# Patient Record
Sex: MALE | Employment: OTHER | URBAN - METROPOLITAN AREA
[De-identification: names, ages, dates, MRNs, and addresses within clinical notes are randomized per-mention and may not be internally consistent; named-entity substitution may affect disease eponyms.]

---

## 2019-10-05 ENCOUNTER — HOSPITAL ENCOUNTER (EMERGENCY)
Facility: CLINIC | Age: 54
Discharge: HOME | End: 2019-10-05
Attending: EMERGENCY MEDICINE | Admitting: EMERGENCY MEDICINE
Payer: COMMERCIAL

## 2019-10-05 ENCOUNTER — NURSE TRIAGE (OUTPATIENT)
Dept: NURSING | Facility: CLINIC | Age: 54
End: 2019-10-05

## 2019-10-05 ENCOUNTER — APPOINTMENT (OUTPATIENT)
Dept: GENERAL RADIOLOGY | Facility: CLINIC | Age: 54
End: 2019-10-05
Attending: EMERGENCY MEDICINE
Payer: COMMERCIAL

## 2019-10-05 VITALS
TEMPERATURE: 98 F | OXYGEN SATURATION: 96 % | RESPIRATION RATE: 18 BRPM | WEIGHT: 205 LBS | HEIGHT: 71 IN | BODY MASS INDEX: 28.7 KG/M2 | SYSTOLIC BLOOD PRESSURE: 122 MMHG | DIASTOLIC BLOOD PRESSURE: 68 MMHG

## 2019-10-05 DIAGNOSIS — S80.02XA CONTUSION OF LEFT KNEE, INITIAL ENCOUNTER: ICD-10-CM

## 2019-10-05 PROCEDURE — 99283 EMERGENCY DEPT VISIT LOW MDM: CPT

## 2019-10-05 PROCEDURE — 73562 X-RAY EXAM OF KNEE 3: CPT | Mod: LT

## 2019-10-05 RX ORDER — IBUPROFEN 600 MG/1
600 TABLET, FILM COATED ORAL EVERY 6 HOURS PRN
Qty: 20 TABLET | Refills: 0 | Status: SHIPPED | OUTPATIENT
Start: 2019-10-05

## 2019-10-05 RX ORDER — ACETAMINOPHEN 500 MG
1000 TABLET ORAL EVERY 6 HOURS PRN
Qty: 30 TABLET | Refills: 0 | Status: SHIPPED | OUTPATIENT
Start: 2019-10-05

## 2019-10-05 ASSESSMENT — ENCOUNTER SYMPTOMS
WEAKNESS: 0
ARTHRALGIAS: 1
NUMBNESS: 0

## 2019-10-05 ASSESSMENT — MIFFLIN-ST. JEOR: SCORE: 1802

## 2019-10-05 NOTE — ED PROVIDER NOTES
"  History     Chief Complaint:  Knee Pain      HPI   Jose Antonio Bailey is a 54 year old male who presents with left knee pain. The patient says that he was at a hotel for a work even and he ended up hitting his knee on the corner of some bricks on one of the rooms. He says that the pain is so bad that he cannot walk sometimes. He says that he tried icing and using heat pads to help the pain, but it did not work. He endorses the use of ibuprofen to minor relief. The patient says that he also had some alcohol earlier tonight.       Allergies:  Morphine  Penicillins      Medications:    Norco  Meloxicam  Robaxin  Ibuprofen      Past Medical History:    Traumatic diastasis of symphysis pubis  Closed fracture of head of right fibula  Sacral fracture  Hyperlipidemia  osteoarthritis  Colon polyps  Insomnia  Congenital spondylolisthesis  Degeneration of intervertebral disc  Alcohol abuse  Pulmonary nodule       Past Surgical History:    Carpal tunnel release x3  Finger trigger release    Family History:    Colon cancer  Prostate cancer    Social History:  The patient was accompanied to the ED by friend.  Smoking Status: Never Smoker  Smokeless Tobacco: Never Used  Alcohol Use: Positive  Drug Use: Negative     Review of Systems   Musculoskeletal: Positive for arthralgias.   Neurological: Negative for weakness and numbness.   All other systems reviewed and are negative.        Physical Exam     Patient Vitals for the past 24 hrs:   BP Temp Temp src Heart Rate Resp SpO2 Height Weight   10/05/19 0133 122/68 98  F (36.7  C) Oral 99 18 96 % 1.803 m (5' 11\") 93 kg (205 lb)        Physical Exam  General: Appears well-developed and well-nourished. Appears somewhat intoxicated, but alert and conversant  Head: No signs of trauma.   CV: Normal rate and regular rhythm.    Resp: Effort normal and breath sounds normal. No respiratory distress.   MSK:  +tenderness directly over left patella.  No medial/lateral tenderness.  No bony " deformities.  +neurovascularly intact distally.    Neuro: The patient is alert and oriented.  Speech normal.  GCS 15  Skin: Skin is warm and dry. Rash to bilateral upper thighs  Psych: normal mood and affect. behavior is normal.     Emergency Department Course     Imaging:  Radiology findings were communicated with the patient who voiced understanding of the findings.    XR Knee Left 3 Views  Mild soft tissue swelling anteriorly. No acute fracture or  dislocation. Slight degenerative changes.  Reading per radiology    Emergency Department Course:    0146 Nursing notes and vitals reviewed.    0151 I performed an exam of the patient as documented above.     0207 The patient was sent for a XR while in the emergency department, results above.      0245 Patient rechecked and updated.      0320 The patient is discharged to home.     Impression & Plan      Medical Decision Making:  Jose Antonio Bailey is a 54 year old male who presents to the emergency department today for evaluation of left knee pain.  Reports that yesterday he hit his left knee on the corner of a stone fireplace and since that time he said significant pain over the central portion of the left patella.  On my evaluation, there is no clear bony deformity.  There is no erythema or warmth.  X-rays obtained did not show signs of fracture.  Patient did request stronger medication such as opiate medications.  I did discuss with him that I do not feel it would be appropriate in this setting and recommended Tylenol, ibuprofen, ice, and rest.  He has received opiate pain medications recently according to his .  His knee was wrapped in Ace bandage and he was given crutches to help rest the leg.  He has been referred to a pain specialist in the past for chronic pain and was recommended to continue to work with them.  He apparently has an appointment to see them on 10/8.        Diagnosis:    ICD-10-CM    1. Contusion of left knee, initial encounter S80.02XA       Disposition:   Findings and plan explained to the Patient. Patient discharged home with instructions regarding supportive care, medications, and reasons to return. The importance of close follow-up was reviewed.      Discharge Medications:     Review of your medicines      START taking      Dose / Directions   acetaminophen 500 MG tablet  Commonly known as:  TYLENOL      Dose:  1,000 mg  Take 2 tablets (1,000 mg) by mouth every 6 hours as needed for mild pain  Quantity:  30 tablet  Refills:  0     ibuprofen 600 MG tablet  Commonly known as:  ADVIL/MOTRIN      Dose:  600 mg  Take 1 tablet (600 mg) by mouth every 6 hours as needed for moderate pain  Quantity:  20 tablet  Refills:  0           Where to get your medicines      Some of these will need a paper prescription and others can be bought over the counter. Ask your nurse if you have questions.    Bring a paper prescription for each of these medications    acetaminophen 500 MG tablet    ibuprofen 600 MG tablet         Scribe Disclosure:  KARTHIK, Roderick Hall, am serving as a scribe at 1:52 AM on 10/5/2019 to document services personally performed by Neto Davis MD based on my observations and the provider's statements to me.       EMERGENCY DEPARTMENT       Neto Davis MD  10/05/19 0595

## 2019-10-05 NOTE — TELEPHONE ENCOUNTER
Partner calling to say patient injury his left knee yesterday.  Patient has been in pain all day.  Patient report pain is pass 10/10; ibuprofen about one hour ago.   Patient is able to walk but is in severe pain.  Reviewed care advice with caller per RN triage protocol.  FNA advised to call back with any concerns.   Caller verbalized understanding.      Reason for Disposition    [1] SEVERE pain (e.g., excruciating, unable to do any normal activities) AND [2] not improved after 2 hours of pain medicine    Additional Information    Negative: Looks like a broken bone or dislocated joint (e.g., crooked or deformed)    Negative: Sounds like a life-threatening emergency to the triager    Negative: Followed a leg injury    Negative: Leg swelling is main symptom    Negative: Back pain radiating (shooting) into leg(s)    Negative: Knee pain is main symptom    Negative: Ankle pain is main symptom    Negative: Pregnant    Negative: Chest pain    Negative: Difficulty breathing    Negative: Entire foot is cool or blue in comparison to other side    Negative: Unable to walk    Negative: [1] Red area or streak AND [2] fever    Negative: [1] Swollen joint AND [2] fever    Negative: [1] Cast on leg or ankle AND [2] now increased pain    Negative: Patient sounds very sick or weak to the triager    Protocols used: LEG PAIN-A-AH

## 2019-10-05 NOTE — ED TRIAGE NOTES
c/o left knee pain. Reports hitting it in the garage yesterday. Pt appears intoxicated and has slurred speech.

## 2019-10-05 NOTE — ED AVS SNAPSHOT
Emergency Department  [address] [place] [id number]  Phone:  [phone]  Fax:  [phone]                                    Jose Antonio Bailey   MRN: [id number]    Department:   Emergency Department   Date of Visit:  10/5/2019           After Visit Summary Signature Page    I have received my discharge instructions, and my questions have been answered. I have discussed any challenges I see with this plan with the nurse or doctor.    ..........................................................................................................................................  Patient/Patient Representative Signature      ..........................................................................................................................................  Patient Representative Print Name and Relationship to Patient    ..................................................               ................................................  Date                                   Time    ..........................................................................................................................................  Reviewed by Signature/Title    ...................................................              ..............................................  Date                                               Time          22EPIC Rev 08/18